# Patient Record
Sex: MALE | Race: WHITE
[De-identification: names, ages, dates, MRNs, and addresses within clinical notes are randomized per-mention and may not be internally consistent; named-entity substitution may affect disease eponyms.]

---

## 2018-07-29 ENCOUNTER — HOSPITAL ENCOUNTER (EMERGENCY)
Dept: HOSPITAL 25 - UCCORT | Age: 38
Discharge: HOME | End: 2018-07-29
Payer: COMMERCIAL

## 2018-07-29 VITALS — SYSTOLIC BLOOD PRESSURE: 133 MMHG | DIASTOLIC BLOOD PRESSURE: 64 MMHG
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
## 2018-07-29 DIAGNOSIS — Z23: ICD-10-CM

## 2018-07-29 DIAGNOSIS — Z87.891: ICD-10-CM

## 2018-07-29 DIAGNOSIS — S61.011A: Primary | ICD-10-CM
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
## 2018-07-29 PROCEDURE — G0463 HOSPITAL OUTPT CLINIC VISIT: HCPCS

## 2018-07-29 PROCEDURE — 99212 OFFICE O/P EST SF 10 MIN: CPT

## 2018-07-29 PROCEDURE — 90715 TDAP VACCINE 7 YRS/> IM: CPT

## 2018-07-29 PROCEDURE — 12001 RPR S/N/AX/GEN/TRNK 2.5CM/<: CPT

## 2018-07-29 PROCEDURE — 90471 IMMUNIZATION ADMIN: CPT

## 2018-07-29 NOTE — UC
Laceration HPI





- HPI Summary


HPI Summary: 





Pt presents with laceration to right thumb. Pt was using knife to clean out 

weedwhacker and knife slipped and lacerated right pad of right thumb. 





- History Of Current Complaint


Chief Complaint: UCLaceration


Stated Complaint: RT THUMB LACERATION


Time Seen by Provider: 07/29/18 13:35


Hx Obtained From: Patient


Laceration Location: Finger - right thumb


Mechanism Of Injury: Sharp Trauma


Onset/Duration: Sudden Onset


Severity: Mild


Pain Intensity: 2


Aggravating Factors: Position, Movement


Related History: Dominant Hand Right





- Allergies/Home Medications


Allergies/Adverse Reactions: 


 Allergies











Allergy/AdvReac Type Severity Reaction Status Date / Time


 


No Known Allergies Allergy   Verified 07/29/18 13:22














PMH/Surg Hx/FS Hx/Imm Hx


Previously Healthy: Yes





- Surgical History


Surgical History: Yes


Surgery Procedure, Year, and Place: VASECTOMY-2012.  Hernia surgery 2013.  T&A





- Family History


Known Family History: Positive: Other - no history of contact dermatitis.





- Social History


Occupation: Employed Full-time


Lives: With Family


Alcohol Use: Occasionally


Substance Use Type: None


Smoking Status (MU): Former Smoker


Have You Smoked in the Last Year: No


When Did the Patient Quit Smoking/Using Tobacco: 2004





- Immunization History


Most Recent Influenza Vaccination: not this season


Most Recent Tetanus Shot: unknown





Review of Systems


Constitutional: Negative


Skin: Other - laceration


Eyes: Negative


ENT: Negative


Respiratory: Negative


Cardiovascular: Negative


Gastrointestinal: Negative


Genitourinary: Negative


Motor: Negative


Neurovascular: Negative


Musculoskeletal: Negative


Neurological: Negative


Psychological: Negative


Is Patient Immunocompromised?: No


All Other Systems Reviewed And Are Negative: Yes





Physical Exam


Triage Information Reviewed: Yes


Appearance: Well-Appearing


Vital Signs: 


 Initial Vital Signs











Temp  98.7 F   07/29/18 13:16


 


Pulse  90   07/29/18 13:16


 


Resp  16   07/29/18 13:16


 


BP  133/64   07/29/18 13:16


 


Pulse Ox  99   07/29/18 13:16











Vital Signs Reviewed: Yes


Eye Exam: Normal


ENT: Positive: Hearing grossly normal


Neck exam: Normal


Respiratory Exam: Normal


Musculoskeletal Exam: Normal


Musculoskeletal: Positive: Strength Intact, ROM Intact


Neurological Exam: Normal


Psychological Exam: Normal


Skin Exam: Other - lacertion right thumb





Laceration Repair





- Laceration Repair


  ** 1


Description: Linear - elliptical right thumb pad, distal


Laceration Size After Repair: Length (cm) - 1, Width (mm) - 2, Depth (mm) - 2


Modified For Repair: No


Irrigation With Pressure Irrigation Device: Yes


Closure Material: Skin Adhesive, SteriStrips


Closure Method: Single Layer


Suture Of: Skin





Laceration Course/Dx





- Course/Dx


Course Of Treatment: Pt was not UTD with tetanus. Pt verbalized understanding 

of laceration and wound care.





- Differential Dx - Laceration/Wound


Differental Diagnoses: Laceration


Provider Diagnoses: laceration right thumb ( repaired with skin adhesive and 

steri strips)





Discharge





- Sign-Out/Discharge


Documenting (check all that apply): Patient Departure





- Discharge Plan


Condition: Stable


Disposition: HOME


Prescriptions: 


Cephalexin CAP* [Keflex 500 CAP*] 500 mg PO Q12H #10 cap


Patient Education Materials:  Laceration (ED), Skin Adhesive Care (ED)


Referrals: 


Fallon Cruz MD [Primary Care Provider] - If Needed


Additional Instructions: 


Please leave splint and steri strips on for 5 days.  Change dressing daily or 

more frequently if soiled.  Do not get wound wet and do not apply antibiotic 

ointment. Watch for signs and symptoms of infection including but not limited 

to , purulent discharge, erythema, worsening tenderness and red streaking from 

wound. 





- Billing Disposition and Condition


Condition: STABLE


Disposition: Home

## 2019-01-04 ENCOUNTER — HOSPITAL ENCOUNTER (EMERGENCY)
Dept: HOSPITAL 25 - UCCORT | Age: 39
Discharge: HOME | End: 2019-01-04
Payer: COMMERCIAL

## 2019-01-04 VITALS — SYSTOLIC BLOOD PRESSURE: 118 MMHG | DIASTOLIC BLOOD PRESSURE: 73 MMHG

## 2019-01-04 DIAGNOSIS — J32.9: Primary | ICD-10-CM

## 2019-01-04 DIAGNOSIS — Z87.891: ICD-10-CM

## 2019-01-04 PROCEDURE — 99212 OFFICE O/P EST SF 10 MIN: CPT

## 2019-01-04 PROCEDURE — G0463 HOSPITAL OUTPT CLINIC VISIT: HCPCS

## 2019-01-05 NOTE — UC
Throat Pain/Nasal Idris HPI





- HPI Summary


HPI Summary: 





38 year old male with PMH + for chornic cough unknown origin no medication 

presents with sinus pressure pain x several days, no improvement, + HA, worse 

with coughing, non-roductive cough worse at night, not allowing sleep.  no TOB, 

no exposures.   + coughing "fits"  no fever, + chills, body aches, fatigue 





- History of Current Complaint


Chief Complaint: UCRespiratory


Stated Complaint: SINUSES, CONGESTION, COUGH


Time Seen by Provider: 01/04/19 18:41


Hx Obtained From: Patient


Onset/Duration: Sudden Onset, Lasting Days


Severity: Moderate


Pain Intensity: 7


Pain Scale Used: 0-10 Numeric


Cough: Nonproductive


Associated Signs & Symptoms: Positive: Sinus Discomfort, Nasal Discharge





- Allergies/Home Medications


Allergies/Adverse Reactions: 


 Allergies











Allergy/AdvReac Type Severity Reaction Status Date / Time


 


No Known Allergies Allergy   Verified 01/04/19 17:29














PMH/Surg Hx/FS Hx/Imm Hx


Previously Healthy: Yes - chronic cough 





- Surgical History


Surgical History: Yes


Surgery Procedure, Year, and Place: VASECTOMY-2012.  Hernia surgery 2013.  T&A.

  RIGHT KNEE SURGERY





- Family History


Known Family History: Positive: Other - no history of contact dermatitis.





- Social History


Alcohol Use: Occasionally


Substance Use Type: None


Smoking Status (MU): Former Smoker


Have You Smoked in the Last Year: No


When Did the Patient Quit Smoking/Using Tobacco: 2004





- Immunization History


Most Recent Influenza Vaccination: not this season


Most Recent Tetanus Shot: unknown





Review of Systems


All Other Systems Reviewed And Are Negative: Yes


Constitutional: Positive: Fatigue


ENT: Positive: Nasal Discharge, Sinus Congestion, Sinus Pain/Tenderness


Respiratory: Positive: Cough


Is Patient Immunocompromised?: No





Physical Exam





- Summary


Physical Exam Summary: 





TTP over frontal max sinus b/l, + periaur tenderness.  


Triage Information Reviewed: Yes


Appearance: No Pain Distress, Well-Nourished, Ill-Appearing - mild


Vital Signs: 


 Initial Vital Signs











Temp  98.7 F   01/04/19 17:30


 


Pulse  104   01/04/19 17:30


 


Resp  24   01/04/19 17:30


 


BP  118/73   01/04/19 17:30


 


Pulse Ox  98   01/04/19 17:30











Vital Signs Reviewed: Yes


Eyes: Positive: Conjunctiva Clear


ENT: Positive: Pharyngeal erythema - minimal, TMs normal, Sinus tenderness, 

Uvula midline.  Negative: Tonsillar swelling, Tonsillar exudate, Muffled voice, 

Hoarse voice


Neck: Positive: Supple, Nontender, No Lymphadenopathy.  Negative: Nuchal 

Rigidity


Respiratory: Positive: Chest non-tender, Lungs clear, Normal breath sounds, No 

respiratory distress, No accessory muscle use.  Negative: Crackles, Rhonchi, 

Stridor, Wheezing


Cardiovascular: Positive: RRR, No Murmur, Pulses Normal


Abdomen Description: Positive: Nontender, No Organomegaly


Psychological Exam: Normal


Skin Exam: Normal





Throat Pain/Nasal Course/Dx





- Course


Course Of Treatment: Sinusitis, abx cough medication given, albuterol inhaler 

for cough symptoms





- Differential Dx/Diagnosis


Differential Diagnosis/HQI/PQRI: Peritonsillar Abscess, Pharyngitis, Sinusitis


Provider Diagnosis: 


 Sinusitis








Discharge





- Sign-Out/Discharge


Documenting (check all that apply): Patient Departure


All imaging exams completed and their final reports reviewed: No Studies





- Discharge Plan


Condition: Fair


Disposition: HOME


Prescriptions: 


Albuterol HFA INHALER* [Ventolin HFA Inhaler*] 1 - 2 puff INH Q4H PRN #1 mdi


 PRN Reason: cough, shortness of breath 


Amoxicillin PO (*) [Amoxicillin 875 MG (*)] 875 mg PO BID #20 tab


Codeine Phosphate/Guaifenesin [Guaifen-Codeine 100-10 mg/5 ml] 5 ml PO Q4H PRN #

40 ml MDD 10ml


 PRN Reason: Cough


Patient Education Materials:  Sinusitis (ED), Acute Bronchitis (ED)


Referrals: 


Fallon Cruz MD [Primary Care Provider] - 


Additional Instructions: 


- Motrin/ tylenol as needed for body aches, fever 


- Albuterol as needed every 4 hours for coughing, spasm 


- Cough medication with codeine for cough at night 


- Increase fluids, humidifier for coughing at night 


- GO to ER with neck pain, increased symtpoms, fever > 101 








- Billing Disposition and Condition


Condition: FAIR


Disposition: Home